# Patient Record
Sex: FEMALE | Race: WHITE | NOT HISPANIC OR LATINO | Employment: STUDENT | URBAN - METROPOLITAN AREA
[De-identification: names, ages, dates, MRNs, and addresses within clinical notes are randomized per-mention and may not be internally consistent; named-entity substitution may affect disease eponyms.]

---

## 2017-10-15 ENCOUNTER — HOSPITAL ENCOUNTER (EMERGENCY)
Facility: HOSPITAL | Age: 22
Discharge: HOME | End: 2017-10-15
Attending: EMERGENCY MEDICINE | Admitting: EMERGENCY MEDICINE
Payer: COMMERCIAL

## 2017-10-15 VITALS
DIASTOLIC BLOOD PRESSURE: 74 MMHG | SYSTOLIC BLOOD PRESSURE: 121 MMHG | HEART RATE: 99 BPM | TEMPERATURE: 98.5 F | OXYGEN SATURATION: 96 % | WEIGHT: 130 LBS | RESPIRATION RATE: 18 BRPM

## 2017-10-15 DIAGNOSIS — R11.0 NAUSEA: ICD-10-CM

## 2017-10-15 DIAGNOSIS — R19.7 DIARRHEA: Primary | ICD-10-CM

## 2017-10-15 LAB
ANION GAP SERPL CALCULATED.3IONS-SCNC: 10 MMOL/L (ref 4–13)
BACTERIA UR QL AUTO: ABNORMAL /HPF
BASOPHILS # BLD AUTO: 0.2 THOUSANDS/ΜL (ref 0–0.1)
BASOPHILS NFR BLD AUTO: 2 % (ref 0–1)
BILIRUB UR QL STRIP: NEGATIVE
BUN SERPL-MCNC: 7 MG/DL (ref 5–25)
CALCIUM SERPL-MCNC: 9.3 MG/DL (ref 8.3–10.1)
CHLORIDE SERPL-SCNC: 105 MMOL/L (ref 100–108)
CLARITY UR: CLEAR
CO2 SERPL-SCNC: 24 MMOL/L (ref 21–32)
COLOR UR: YELLOW
CREAT SERPL-MCNC: 0.91 MG/DL (ref 0.6–1.3)
EOSINOPHIL # BLD AUTO: 0 THOUSAND/ΜL (ref 0–0.61)
EOSINOPHIL NFR BLD AUTO: 0 % (ref 0–6)
ERYTHROCYTE [DISTWIDTH] IN BLOOD BY AUTOMATED COUNT: 11.6 % (ref 11.6–15.1)
EXT PREG TEST URINE: NEGATIVE
GFR SERPL CREATININE-BSD FRML MDRD: 90 ML/MIN/1.73SQ M
GLUCOSE SERPL-MCNC: 113 MG/DL (ref 65–140)
GLUCOSE UR STRIP-MCNC: NEGATIVE MG/DL
HCT VFR BLD AUTO: 40.5 % (ref 37–47)
HGB BLD-MCNC: 13.6 G/DL (ref 12–16)
HGB UR QL STRIP.AUTO: ABNORMAL
KETONES UR STRIP-MCNC: ABNORMAL MG/DL
LEUKOCYTE ESTERASE UR QL STRIP: NEGATIVE
LYMPHOCYTES # BLD AUTO: 1.5 THOUSANDS/ΜL (ref 0.6–4.47)
LYMPHOCYTES NFR BLD AUTO: 16 % (ref 14–44)
MCH RBC QN AUTO: 29.4 PG (ref 27–31)
MCHC RBC AUTO-ENTMCNC: 33.5 G/DL (ref 31.4–37.4)
MCV RBC AUTO: 88 FL (ref 82–98)
MONOCYTES # BLD AUTO: 0.2 THOUSAND/ΜL (ref 0.17–1.22)
MONOCYTES NFR BLD AUTO: 2 % (ref 4–12)
NEUTROPHILS # BLD AUTO: 7.6 THOUSANDS/ΜL (ref 1.85–7.62)
NEUTS SEG NFR BLD AUTO: 81 % (ref 43–75)
NITRITE UR QL STRIP: NEGATIVE
NON-SQ EPI CELLS URNS QL MICRO: ABNORMAL /HPF
PH UR STRIP.AUTO: 7.5 [PH] (ref 5–9)
PLATELET # BLD AUTO: 296 THOUSANDS/UL (ref 130–400)
PMV BLD AUTO: 7.3 FL (ref 8.9–12.7)
POTASSIUM SERPL-SCNC: 3.4 MMOL/L (ref 3.5–5.3)
PROT UR STRIP-MCNC: NEGATIVE MG/DL
RBC # BLD AUTO: 4.62 MILLION/UL (ref 4.2–5.4)
RBC #/AREA URNS AUTO: ABNORMAL /HPF
SODIUM SERPL-SCNC: 139 MMOL/L (ref 136–145)
SP GR UR STRIP.AUTO: <=1.005 (ref 1–1.03)
UROBILINOGEN UR QL STRIP.AUTO: 0.2 E.U./DL
WBC # BLD AUTO: 9.5 THOUSAND/UL (ref 4.8–10.8)
WBC #/AREA URNS AUTO: ABNORMAL /HPF

## 2017-10-15 PROCEDURE — 99283 EMERGENCY DEPT VISIT LOW MDM: CPT

## 2017-10-15 PROCEDURE — 96361 HYDRATE IV INFUSION ADD-ON: CPT

## 2017-10-15 PROCEDURE — 81001 URINALYSIS AUTO W/SCOPE: CPT | Performed by: EMERGENCY MEDICINE

## 2017-10-15 PROCEDURE — 80048 BASIC METABOLIC PNL TOTAL CA: CPT | Performed by: EMERGENCY MEDICINE

## 2017-10-15 PROCEDURE — 36415 COLL VENOUS BLD VENIPUNCTURE: CPT | Performed by: EMERGENCY MEDICINE

## 2017-10-15 PROCEDURE — 96374 THER/PROPH/DIAG INJ IV PUSH: CPT

## 2017-10-15 PROCEDURE — 81025 URINE PREGNANCY TEST: CPT | Performed by: EMERGENCY MEDICINE

## 2017-10-15 PROCEDURE — 85025 COMPLETE CBC W/AUTO DIFF WBC: CPT | Performed by: EMERGENCY MEDICINE

## 2017-10-15 RX ORDER — ONDANSETRON 4 MG/1
4 TABLET, FILM COATED ORAL EVERY 6 HOURS
Qty: 6 TABLET | Refills: 0 | Status: SHIPPED | OUTPATIENT
Start: 2017-10-15

## 2017-10-15 RX ORDER — ONDANSETRON 2 MG/ML
4 INJECTION INTRAMUSCULAR; INTRAVENOUS ONCE
Status: COMPLETED | OUTPATIENT
Start: 2017-10-15 | End: 2017-10-15

## 2017-10-15 RX ADMIN — ONDANSETRON 4 MG: 2 INJECTION INTRAMUSCULAR; INTRAVENOUS at 05:07

## 2017-10-15 RX ADMIN — SODIUM CHLORIDE 1000 ML: 0.9 INJECTION, SOLUTION INTRAVENOUS at 05:07

## 2017-10-15 NOTE — ED PROVIDER NOTES
History  Chief Complaint   Patient presents with    Nausea     nausea and vomitting happened two weeks ago and resolved  now having pain in belly and back  Pt in ER with c/o nausea adiarrhea x 1 day  She also c/o vomiting x 1 day with spontaneous resolution 2wks ago  Pt denies sick contacts and she states that she is compliant with her celiac diet  She also denies recent abx use  None       History reviewed  No pertinent past medical history  History reviewed  No pertinent surgical history  History reviewed  No pertinent family history  I have reviewed and agree with the history as documented  Social History   Substance Use Topics    Smoking status: Never Smoker    Smokeless tobacco: Never Used    Alcohol use No        Review of Systems   Constitutional: Negative for chills and fever  Respiratory: Negative for cough, chest tightness and shortness of breath  Gastrointestinal: Positive for abdominal pain, diarrhea and nausea  Negative for vomiting  Genitourinary: Negative for dysuria, frequency, hematuria and urgency  Musculoskeletal: Negative for back pain, neck pain and neck stiffness  All other systems reviewed and are negative  Physical Exam  ED Triage Vitals [10/15/17 0329]   Temperature Pulse Respirations Blood Pressure SpO2   98 5 °F (36 9 °C) 99 18 121/74 99 %      Temp Source Heart Rate Source Patient Position - Orthostatic VS BP Location FiO2 (%)   Tympanic Monitor Sitting Right arm --      Pain Score       6           Physical Exam   Constitutional: She appears well-developed and well-nourished  No distress  HENT:   Head: Normocephalic and atraumatic  Eyes: Conjunctivae are normal  Pupils are equal, round, and reactive to light  Neck: Normal range of motion  Neck supple  Cardiovascular: Normal rate, regular rhythm and normal heart sounds  No murmur heard  Pulmonary/Chest: Effort normal and breath sounds normal  No respiratory distress  Abdominal: Soft  Bowel sounds are normal  She exhibits no distension and no mass  There is tenderness  There is no rebound and no guarding  No hernia  TTP in suprapubic area   Musculoskeletal: Normal range of motion  She exhibits no edema or deformity  Neurological: She is alert  No cranial nerve deficit  Skin: Skin is warm and dry  No rash noted  She is not diaphoretic  No pallor  Psychiatric: She has a normal mood and affect  Her behavior is normal    Nursing note and vitals reviewed  ED Medications  Medications   sodium chloride 0 9 % bolus 1,000 mL (0 mL Intravenous Stopped 10/15/17 1349)   ondansetron (ZOFRAN) injection 4 mg (4 mg Intravenous Given 10/15/17 8430)       Diagnostic Studies  Labs Reviewed   BASIC METABOLIC PANEL - Abnormal        Result Value Ref Range Status    Potassium 3 4 (*) 3 5 - 5 3 mmol/L Final    Sodium 139  136 - 145 mmol/L Final    Chloride 105  100 - 108 mmol/L Final    CO2 24  21 - 32 mmol/L Final    Anion Gap 10  4 - 13 mmol/L Final    BUN 7  5 - 25 mg/dL Final    Creatinine 0 91  0 60 - 1 30 mg/dL Final    Comment: Standardized to IDMS reference method    Glucose 113  65 - 140 mg/dL Final    Comment:   If the patient is fasting, the ADA then defines impaired fasting glucose as > 100 mg/dL and diabetes as > or equal to 123 mg/dL  Specimen collection should occur prior to Sulfasalazine administration due to the potential for falsely depressed results  Specimen collection should occur prior to Sulfapyridine administration due to the potential for falsely elevated results  Calcium 9 3  8 3 - 10 1 mg/dL Final    eGFR 90  ml/min/1 73sq m Final    Narrative:     National Kidney Disease Education Program recommendations are as follows:  GFR calculation is accurate only with a steady state creatinine  Chronic Kidney disease less than 60 ml/min/1 73 sq  meters  Kidney failure less than 15 ml/min/1 73 sq  meters     CBC AND DIFFERENTIAL - Abnormal     MPV 7 3 (*) 8 9 - 12 7 fL Final    Neutrophils Relative 81 (*) 43 - 75 % Final    Monocytes Relative 2 (*) 4 - 12 % Final    Basophils Relative 2 (*) 0 - 1 % Final    Basophils Absolute 0 20 (*) 0 00 - 0 10 Thousands/µL Final    WBC 9 50  4 80 - 10 80 Thousand/uL Final    RBC 4 62  4 20 - 5 40 Million/uL Final    Hemoglobin 13 6  12 0 - 16 0 g/dL Final    Hematocrit 40 5  37 0 - 47 0 % Final    MCV 88  82 - 98 fL Final    MCH 29 4  27 0 - 31 0 pg Final    MCHC 33 5  31 4 - 37 4 g/dL Final    RDW 11 6  11 6 - 15 1 % Final    Platelets 875  604 - 400 Thousands/uL Final    Lymphocytes Relative 16  14 - 44 % Final    Eosinophils Relative 0  0 - 6 % Final    Neutrophils Absolute 7 60  1 85 - 7 62 Thousands/µL Final    Lymphocytes Absolute 1 50  0 60 - 4 47 Thousands/µL Final    Monocytes Absolute 0 20  0 17 - 1 22 Thousand/µL Final    Eosinophils Absolute 0 00  0 00 - 0 61 Thousand/µL Final   URINALYSIS WITH REFLEX TO MICROSCOPIC - Abnormal     Ketones, UA 15 (1+) (*) Negative mg/dl Final    Blood, UA Small (*) Negative Final    Color, UA Yellow   Final    Clarity, UA Clear   Final    Specific Gravity, UA <=1 005  1 000 - 1 030 Final    pH, UA 7 5  5 0 - 9 0 Final    Leukocytes, UA Negative  Negative Final    Nitrite, UA Negative  Negative Final    Protein, UA Negative  Negative mg/dl Final    Glucose, UA Negative  Negative mg/dl Final    Urobilinogen, UA 0 2  0 2, 1 0 E U /dl E U /dl Final    Bilirubin, UA Negative  Negative Final   URINE MICROSCOPIC - Abnormal     WBC, UA 1-2 (*) None Seen, 0-5, 5-55, 5-65 /hpf Final    RBC, UA None Seen  None Seen, 0-5 /hpf Final    Epithelial Cells Occasional  None Seen, Occasional /hpf Final    Bacteria, UA None Seen  None Seen, Occasional /hpf Final   POCT PREGNANCY, URINE - Normal    EXT PREG TEST UR (Ref: Negative) negative   Final       No orders to display       Procedures  Procedures      Phone Contacts  ED Phone Contact    ED Course  ED Course                                MDM  Number of Diagnoses or Management Options  Diarrhea:   Nausea:   Diagnosis management comments: Pt reevaluated, and states that her symptoms have resolved  Last BM was prior to coming to the Er  Recommended diet changes and outpt f/u with GI if symptoms persist  Will d/c to home    CritCare Time    Disposition  Final diagnoses:   Diarrhea   Nausea     ED Disposition     ED Disposition Condition Comment    Discharge  Deidra Lee discharge to home/self care  Condition at discharge: Stable        Follow-up Information     Follow up With Specialties Details Why C/ [address], [address]      Mairusz Kennedy MD Gastroenterology Call in 1 day  [facility]  [facility] [place] [facility]  [phone]          Discharge Medication List as of 10/15/2017  7:11 AM      START taking these medications    Details   ondansetron (ZOFRAN) 4 mg tablet Take 1 tablet by mouth every 6 (six) hours, Starting Sun 10/15/2017, Print           No discharge procedures on file      ED Provider  Electronically Signed by       Kaela Soto DO  10/15/17 8942

## 2017-10-15 NOTE — DISCHARGE INSTRUCTIONS
Acute Diarrhea   WHAT YOU NEED TO KNOW:   Acute diarrhea starts quickly and lasts a short time, usually 1 to 3 days  It can last up to 2 weeks  You may not be able to control your diarrhea  Acute diarrhea usually stops on its own  DISCHARGE INSTRUCTIONS:   Return to the emergency department if:   · You feel confused  · Your heartbeat is faster than normal      · Your eyes look deeply sunken, or you have no tears when you cry  · You urinate less than usual, or your urine is dark yellow  · You have blood or mucus in your stools  · You have severe abdominal pain  · You are unable to drink any liquids  Contact your healthcare provider if:   · Your symptoms do not get better with treatment  · You have a fever higher than 101 3°F (38 5°C)  · You have trouble eating and drinking because you are vomiting  · You are thirsty or have a dry mouth  · Your diarrhea does not get better in 7 days  · You have questions or concerns about your condition or care  Follow up with your healthcare provider as directed:  Write down your questions so you remember to ask them during your visits  Medicines:  · Diarrhea medicine  is an over-the-counter medicine that helps slow or stop your diarrhea  If you take other medicines, talk to your healthcare provider before you take diarrhea medicine  · Antibiotics  may be given to help treat an infection caused by bacteria  · Antiparasitics  may be given to treat an infection caused by parasites  · Take your medicine as directed  Contact your healthcare provider if you think your medicine is not helping or if you have side effects  Tell him of her if you are allergic to any medicine  Keep a list of the medicines, vitamins, and herbs you take  Include the amounts, and when and why you take them  Bring the list or the pill bottles to follow-up visits  Carry your medicine list with you in case of an emergency    Self-care:   · Drink liquids as directed  Liquids will help prevent dehydration caused by diarrhea  Ask your healthcare provider how much liquid to drink each day and which liquids are best for you  You may need to drink an oral rehydration solution (ORS)  An ORS has the right amounts of water, salts, and sugar you need to replace body fluids  You can buy an ORS at most grocery stores and pharmacies  · Eat foods that are easy to digest   Examples include rice, lentils, cereal, bananas, potatoes, and bread  It also includes some fruits (bananas, melon), well-cooked vegetables, and lean meats  Avoid foods high in fiber, fat, and sugar  Also avoid caffeine, alcohol, dairy, and red meat until your diarrhea is gone  Prevent acute diarrhea:   · Wash your hands often  Use soap and water  Wash your hands before you eat or prepare food  Also wash your hands after you use the bathroom  Use an alcohol-based hand gel when soap and water are not available  · Keep bathroom surfaces clean  This helps prevent the spread of germs that cause acute diarrhea  · Wash fruits and vegetables well before you eat them  This can help remove germs that cause diarrhea  If possible, remove the skin from fruits and vegetables, or cook them well before you eat them  · Cook meat as directed  ¨ Cook ground meat  to 160°F      ¨ Cook ground poultry, whole poultry, or cuts of poultry  to at least 165°F  Remove the meat from heat  Let it stand for 3 minutes before you eat it  ¨ Cook whole cuts of meat other than poultry  to at least 145°F  Remove the meat from heat  Let it stand for 3 minutes before you eat it  · Wash dishes that have touched raw meat with hot water and soap  This includes cutting boards, utensils, dishes, and serving containers  · Place raw or cooked meat in the refrigerator as soon as possible  Bacteria can grow in meat that is left at room temperature too long  · Do not eat raw or undercooked oysters, clams, or mussels  These foods may be contaminated and cause infection  · Drink filtered or treated water only when you travel  Do not put ice in your drinks  Drink bottled water whenever possible  © 2017 [address] [employer] Information is for End User's use only and may not be sold, redistributed or otherwise used for commercial purposes  All illustrations and images included in CareNotes® are the copyrighted property of A D [employer] M [employer]  or [employer]  The above information is an  only  It is not intended as medical advice for individual conditions or treatments  Talk to your doctor, nurse or pharmacist before following any medical regimen to see if it is safe and effective for you

## 2019-08-30 ENCOUNTER — OFFICE VISIT (OUTPATIENT)
Dept: URGENT CARE | Facility: CLINIC | Age: 24
End: 2019-08-30
Payer: COMMERCIAL

## 2019-08-30 VITALS
RESPIRATION RATE: 16 BRPM | BODY MASS INDEX: 24.11 KG/M2 | DIASTOLIC BLOOD PRESSURE: 76 MMHG | SYSTOLIC BLOOD PRESSURE: 131 MMHG | WEIGHT: 150 LBS | OXYGEN SATURATION: 98 % | TEMPERATURE: 98.1 F | HEART RATE: 78 BPM | HEIGHT: 66 IN

## 2019-08-30 DIAGNOSIS — L23.7 POISON IVY DERMATITIS: Primary | ICD-10-CM

## 2019-08-30 PROCEDURE — 99213 OFFICE O/P EST LOW 20 MIN: CPT | Performed by: NURSE PRACTITIONER

## 2019-08-30 RX ORDER — CALCIUM ACETATE MONOHYDRATE AND ALUMINUM SULFATE TETRADECAHYDRATE 952; 1347 MG/2299MG; MG/2299MG
1 POWDER, FOR SOLUTION TOPICAL 3 TIMES DAILY
Qty: 15 EACH | Refills: 0 | Status: SHIPPED | OUTPATIENT
Start: 2019-08-30 | End: 2021-10-13

## 2019-08-30 RX ORDER — PREDNISONE 20 MG/1
20 TABLET ORAL 2 TIMES DAILY WITH MEALS
Qty: 14 TABLET | Refills: 0 | Status: SHIPPED | OUTPATIENT
Start: 2019-08-30 | End: 2019-09-06

## 2019-08-30 RX ORDER — DICYCLOMINE HYDROCHLORIDE 10 MG/1
5 CAPSULE ORAL AS NEEDED
COMMUNITY
End: 2021-10-13

## 2019-08-30 RX ORDER — BUSPIRONE HYDROCHLORIDE 5 MG/1
5 TABLET ORAL DAILY
COMMUNITY
End: 2021-10-13

## 2019-08-30 NOTE — PATIENT INSTRUCTIONS
Use Zanfel OTC to wash the areas, use as directed on box  Use benadryl to help with the itching  Can apply cool compress for comfort  Can take tempid baths with colloidal oatmeal(i e Aveeno)  Take Prednisone as prescribed  Wash all bedding and cloths on hot  If symptoms worsen or you have difficulty breathing go directly to the ER

## 2019-08-30 NOTE — PROGRESS NOTES
[facility]        NAME: Payal Vaughan is a 25 y o  female  : 1995    MRN: [id number]  DATE: 2019  TIME: 11:55 AM    Assessment and Plan   Poison ivy dermatitis [L23 7]  1  Poison ivy dermatitis  aluminum sulfate-calcium acetate (DOMEBORO) packet    predniSONE 20 mg tablet         Patient Instructions     Use Zanfel OTC to wash the areas, use as directed on box  Use benadryl to help with the itching  Can apply cool compress for comfort  Can take tempid baths with colloidal oatmeal(i e Aveeno)  Take Prednisone as prescribed  Wash all bedding and cloths on hot  If symptoms worsen or you have difficulty breathing go directly to the ER  Follow up with PCP in 3-5 days  Proceed to  ER if symptoms worsen  Chief Complaint     Chief Complaint   Patient presents with    Rash     rash on both legs since tuesday, itchy, oozing, Pt has been in the yard cleaning up and poison was there          History of Present Illness       26 y/o female presents with rash to her bilateral lower extremities  She states the rash has been since Tuesday  She had been doing yard work around [facility] [employer]  The rash is itchy and oozing  The itching prevents her from sleeping  She has applied rubbing alcohol to the areas, which resulted in pain  She has not tried any other OTC supportive treatment  Review of Systems   Review of Systems   Constitutional: Negative for chills and fever  Respiratory: Negative for cough  Cardiovascular: Negative for chest pain and palpitations  Gastrointestinal: Negative for nausea and vomiting  Skin: Positive for color change and rash           Current Medications       Current Outpatient Medications:     busPIRone (BUSPAR) 5 mg tablet, Take 5 mg by mouth daily, Disp: , Rfl:     norethindrone-ethinyl estradiol-iron (ESTROSTEP FE) 1-20/1-30/1-35 MG-MCG TABS, Take by mouth daily, Disp: , Rfl:     aluminum sulfate-calcium acetate (DOMEBORO) packet, Apply 1 packet topically 3 (three) times a day for 5 days, Disp: 15 each, Rfl: 0    dicyclomine (BENTYL) 10 mg capsule, Take 5 mg by mouth as needed, Disp: , Rfl:     ondansetron (ZOFRAN) 4 mg tablet, Take 1 tablet by mouth every 6 (six) hours (Patient not taking: Reported on 8/30/2019), Disp: 6 tablet, Rfl: 0    predniSONE 20 mg tablet, Take 1 tablet (20 mg total) by mouth 2 (two) times a day with meals for 7 days, Disp: 14 tablet, Rfl: 0    Current Allergies     Allergies as of 08/30/2019 - Reviewed 08/30/2019   Allergen Reaction Noted    Gluten meal  10/15/2017            The following portions of the patient's history were reviewed and updated as appropriate: allergies, current medications, past family history, past medical history, past social history, past surgical history and problem list      Past Medical History:   Diagnosis Date    Anxiety     Celiac disease     IBS (irritable bowel syndrome)        Past Surgical History:   Procedure Laterality Date    FRACTURE SURGERY         History reviewed  No pertinent family history  Medications have been verified  Objective   /76   Pulse 78   Temp 98 1 °F (36 7 °C)   Resp 16   Ht 5' 6" (1 676 m)   Wt 68 kg (150 lb)   LMP 07/30/2019   SpO2 98%   BMI 24 21 kg/m²        Physical Exam     Physical Exam   Constitutional: She is oriented to person, place, and time  Vital signs are normal  She appears well-developed and well-nourished  No distress  Cardiovascular: Normal rate, regular rhythm and normal heart sounds  Pulmonary/Chest: Effort normal and breath sounds normal  No respiratory distress  Neurological: She is alert and oriented to person, place, and time  She has normal strength  GCS eye subscore is 4  GCS verbal subscore is 5  GCS motor subscore is 6  Skin: Skin is warm and dry  Rash noted  Rash is vesicular  To the bilateral lower extremities there is a red raised vesicular rash  There is yellowish drainage present    The rash is scattered on the lower extremities  It is confluent to the bilateral posterior knees

## 2020-01-13 ENCOUNTER — OFFICE VISIT (OUTPATIENT)
Dept: URGENT CARE | Facility: CLINIC | Age: 25
End: 2020-01-13
Payer: COMMERCIAL

## 2020-01-13 VITALS
RESPIRATION RATE: 20 BRPM | BODY MASS INDEX: 24.59 KG/M2 | OXYGEN SATURATION: 100 % | HEIGHT: 66 IN | TEMPERATURE: 97.2 F | SYSTOLIC BLOOD PRESSURE: 115 MMHG | HEART RATE: 60 BPM | DIASTOLIC BLOOD PRESSURE: 69 MMHG | WEIGHT: 153 LBS

## 2020-01-13 DIAGNOSIS — R09.82 POSTNASAL DRIP: Primary | ICD-10-CM

## 2020-01-13 PROCEDURE — 99213 OFFICE O/P EST LOW 20 MIN: CPT | Performed by: FAMILY MEDICINE

## 2020-01-13 RX ORDER — CYCLOBENZAPRINE HCL 5 MG
TABLET ORAL
COMMUNITY
Start: 2019-10-11 | End: 2021-10-13

## 2020-01-13 RX ORDER — OSELTAMIVIR PHOSPHATE 75 MG/1
75 CAPSULE ORAL DAILY
Qty: 10 CAPSULE | Refills: 0 | Status: SHIPPED | OUTPATIENT
Start: 2020-01-13 | End: 2020-01-23

## 2020-01-13 NOTE — LETTER
January 13, 2020     Patient: Malia Person   YOB: 1995   Date of Visit: 1/13/2020       To Whom It May Concern:    Art Ada was evaluated here on 01/13/2020  Please excuse her absence  If you have any questions or concerns, please don't hesitate to call           Sincerely,        Link Meza MD    CC: No Recipients

## 2020-01-13 NOTE — PROGRESS NOTES
[facility]        NAME: Patti Bains is a 25 y o  female  : 1995    MRN: [id number]  DATE: 2020  TIME: 10:04 AM    Assessment and Plan   Postnasal drip [R09 82]  1  Postnasal drip  oseltamivir (TAMIFLU) 75 mg capsule     Bacterial sinusitis, strep pharyngitis and pneumonia unlikely per clinical evaluation  Likely secondary to postnasal drip from rhinitis  Patient advised on supportive therapy including remaining well hydrated, avoiding cold fluids and gargling with warm salt water twice daily  Instructed to use Flonase twice daily for the next 5 days  Due to continued exposure to her younger sister who likely has influenza, will prescribe Tamiflu prophylactically  May f/u with PCP in a few days if Sxs worsen  Patient Instructions     Follow up with PCP in 3-5 days  Proceed to  ER if symptoms worsen  Chief Complaint     Chief Complaint   Patient presents with    Flu Symptoms     4 family members dx with flu   last night pt started with chills, shakes, stomach pain, fatigue, runny nose, sneezing  History of Present Illness       27-year-old female presents today due to 3-4 days of a mild cough associated with postnasal drip, sore throat and hoarseness  Reports that the symptoms usually present when she 1st wakes up in the morning and is usually asymptomatic during the course of the day  However last night she experienced a bout of abdominal pain associated with nausea, tactile fevers and chills  Was concerned because her sister, who she lives with, had similar symptoms resulting in a bout of emesis  Of note, others in household have been sick including her mom and younger sister  Her older sister was recently diagnosed with influenza infection which she likely contracted from her grandfather who has been hospitalized for the same illness  The patient has had considerable contact with both people        Review of Systems   Review of Systems   Constitutional: Positive for chills and fever (tactile)  HENT: Positive for postnasal drip, sore throat (mild) and voice change  Respiratory: Positive for cough (mild)  Gastrointestinal: Positive for abdominal pain and nausea  Psychiatric/Behavioral: Positive for sleep disturbance  Current Medications       Current Outpatient Medications:     busPIRone (BUSPAR) 5 mg tablet, Take 5 mg by mouth daily, Disp: , Rfl:     norethindrone-ethinyl estradiol-iron (ESTROSTEP FE) 1-20/1-30/1-35 MG-MCG TABS, Take by mouth daily, Disp: , Rfl:     aluminum sulfate-calcium acetate (DOMEBORO) packet, Apply 1 packet topically 3 (three) times a day for 5 days, Disp: 15 each, Rfl: 0    cyclobenzaprine (FLEXERIL) 5 mg tablet, take 1 tablet by mouth NIGHTLY, Disp: , Rfl:     dicyclomine (BENTYL) 10 mg capsule, Take 5 mg by mouth as needed, Disp: , Rfl:     ondansetron (ZOFRAN) 4 mg tablet, Take 1 tablet by mouth every 6 (six) hours (Patient not taking: Reported on 8/30/2019), Disp: 6 tablet, Rfl: 0    oseltamivir (TAMIFLU) 75 mg capsule, Take 1 capsule (75 mg total) by mouth daily for 10 days, Disp: 10 capsule, Rfl: 0    Current Allergies     Allergies as of 01/13/2020 - Reviewed 01/13/2020   Allergen Reaction Noted    Gluten meal  10/15/2017            The following portions of the patient's history were reviewed and updated as appropriate: allergies, current medications, past family history, past medical history, past social history, past surgical history and problem list      Past Medical History:   Diagnosis Date    Anxiety     Celiac disease     IBS (irritable bowel syndrome)        Past Surgical History:   Procedure Laterality Date    FRACTURE SURGERY Right     foot and ankle reconstruction       Family History   Problem Relation Age of Onset    Hypertension Mother          Medications have been verified          Objective   /69   Pulse 60   Temp (!) 97 2 °F (36 2 °C)   Resp 20   Ht 5' 6" (1 676 m)   Wt 69 4 kg (153 lb)   LMP 12/25/2019 (Exact Date)   SpO2 100%   Breastfeeding? No   BMI 24 69 kg/m²        Physical Exam     Physical Exam   Constitutional: She is oriented to person, place, and time  She appears well-developed and well-nourished  No distress  HENT:   Head: Normocephalic and atraumatic  Right Ear: External ear normal    Left Ear: External ear normal    Nose: Nose normal    Mouth/Throat: Oropharynx is clear and moist    Eyes: Conjunctivae are normal  Right eye exhibits no discharge  Left eye exhibits no discharge  Cardiovascular: Normal rate and regular rhythm  Pulmonary/Chest: Effort normal and breath sounds normal  No stridor  No respiratory distress  She has no wheezes  She has no rales  Neurological: She is alert and oriented to person, place, and time  Skin: Skin is warm  She is not diaphoretic  No erythema  Psychiatric: She has a normal mood and affect  Her behavior is normal  Judgment and thought content normal    Nursing note and vitals reviewed

## 2021-08-17 ENCOUNTER — TELEPHONE (OUTPATIENT)
Dept: GASTROENTEROLOGY | Facility: CLINIC | Age: 26
End: 2021-08-17

## 2021-08-17 NOTE — TELEPHONE ENCOUNTER
Recall call went out via [facility] in 2020 with no return calls from pt to schedule  Pt is due for a recall colon with Dr Meryle Kitty for hx of atrium polyp  I lmom for pt to please call back to schedule a procedure with Dr Meryle Kitty   Will call pt again in one week if do not hear back from her

## 2021-08-24 NOTE — TELEPHONE ENCOUNTER
Lmoc for pt to please call back to schedule a procedure with Dr Lilibeth Handley   Will call pt again on home number in two weeks if do not hear back from her

## 2021-09-07 NOTE — TELEPHONE ENCOUNTER
Called and spoke to mom Ann Patsysulema 59 on home number whom informed that pt moved out and was   She said that she will have her give us a call

## 2021-10-13 ENCOUNTER — OFFICE VISIT (OUTPATIENT)
Dept: URGENT CARE | Facility: CLINIC | Age: 26
End: 2021-10-13
Payer: COMMERCIAL

## 2021-10-13 VITALS
TEMPERATURE: 98.6 F | HEIGHT: 66 IN | RESPIRATION RATE: 18 BRPM | OXYGEN SATURATION: 98 % | SYSTOLIC BLOOD PRESSURE: 122 MMHG | DIASTOLIC BLOOD PRESSURE: 78 MMHG | BODY MASS INDEX: 24.62 KG/M2 | HEART RATE: 73 BPM | WEIGHT: 153.2 LBS

## 2021-10-13 DIAGNOSIS — L03.032 PARONYCHIA OF GREAT TOE OF LEFT FOOT: Primary | ICD-10-CM

## 2021-10-13 PROCEDURE — 99213 OFFICE O/P EST LOW 20 MIN: CPT | Performed by: FAMILY MEDICINE

## 2021-10-13 RX ORDER — BUSPIRONE HYDROCHLORIDE 5 MG/1
5 TABLET ORAL 2 TIMES DAILY
COMMUNITY

## 2021-10-13 RX ORDER — CEPHALEXIN 500 MG/1
500 CAPSULE ORAL EVERY 12 HOURS SCHEDULED
Qty: 10 CAPSULE | Refills: 0 | Status: SHIPPED | OUTPATIENT
Start: 2021-10-13 | End: 2021-10-18

## 2021-10-13 RX ORDER — BIOTIN 10 MG
TABLET ORAL DAILY
COMMUNITY

## 2023-04-06 ENCOUNTER — HOSPITAL ENCOUNTER (OUTPATIENT)
Dept: RADIOLOGY | Facility: HOSPITAL | Age: 28
Discharge: HOME | End: 2023-04-06

## 2023-04-06 DIAGNOSIS — N64.4 BREAST PAIN, LEFT: ICD-10-CM
